# Patient Record
Sex: MALE | Race: WHITE | NOT HISPANIC OR LATINO | Employment: STUDENT | URBAN - METROPOLITAN AREA
[De-identification: names, ages, dates, MRNs, and addresses within clinical notes are randomized per-mention and may not be internally consistent; named-entity substitution may affect disease eponyms.]

---

## 2024-04-11 ENCOUNTER — ATHLETIC TRAINING (OUTPATIENT)
Dept: SPORTS MEDICINE | Facility: OTHER | Age: 19
End: 2024-04-11

## 2024-04-11 ENCOUNTER — TELEPHONE (OUTPATIENT)
Age: 19
End: 2024-04-11

## 2024-04-11 DIAGNOSIS — G44.319 ACUTE POST-TRAUMATIC HEADACHE, NOT INTRACTABLE: Primary | ICD-10-CM

## 2024-04-11 NOTE — PROGRESS NOTES
This athlete came to the athletic training room around 6:30 this morning 4/11/24 before practice to report and injury that occurred at the game on 4/10. The hit that the athlete described was not witnessed by the  and during the game the athlete did not report any incident or symptoms to the athletic training staff. The athlete told the  during evaluation this morning that he felt fine during the game and that he had no issues finishing the game. He said that once he got home and took a shower and his body had time to relax a little that he started to develop some symptoms and that he woke up with the same symptoms this morning. He reports a Headache (3),Pressure in Head (2), Nausea (4), Sensitivity to Noise (1), Fatigue (4), Irritability (1), Nervous or anxious (1) trouble falling asleep (3) during his SCAT six exam this morning. The athlete reported that he took a hit helmet to helmet in his lacrosse game yesterday in the second quarter. The athlete described the hit as hard but no symptoms imdeatly after. This morning the athlete looked a little tired and slowed from his usual self. He also did not have as much energy and had no quick one line jokes as he usually has. Other than that the athlete is acting normal and all other vitals normal. VOMS exam was WNL but convergence did make the symptoms slightly worse. The athlete reports no other pain at this time. The athlete has had a previous concussion while in college. That was in his freshman year in 2021. The athlete also reports 2 others in HS. The athlete was out 3wks with the injury in 2021. The athlete will be referred to the team physican for follow up exam

## 2024-04-12 ENCOUNTER — TELEPHONE (OUTPATIENT)
Dept: OBGYN CLINIC | Facility: CLINIC | Age: 19
End: 2024-04-12

## 2024-04-12 ENCOUNTER — OFFICE VISIT (OUTPATIENT)
Dept: OBGYN CLINIC | Facility: CLINIC | Age: 19
End: 2024-04-12

## 2024-04-12 VITALS
WEIGHT: 150 LBS | SYSTOLIC BLOOD PRESSURE: 127 MMHG | BODY MASS INDEX: 21 KG/M2 | DIASTOLIC BLOOD PRESSURE: 76 MMHG | HEIGHT: 71 IN | HEART RATE: 85 BPM

## 2024-04-12 DIAGNOSIS — G44.319 ACUTE POST-TRAUMATIC HEADACHE, NOT INTRACTABLE: Primary | ICD-10-CM

## 2024-04-12 DIAGNOSIS — J06.9 UPPER RESPIRATORY INFECTION, ACUTE: ICD-10-CM

## 2024-04-12 PROCEDURE — 99203 OFFICE O/P NEW LOW 30 MIN: CPT | Performed by: ORTHOPAEDIC SURGERY

## 2024-04-12 RX ORDER — AZITHROMYCIN 250 MG/1
TABLET, FILM COATED ORAL
COMMUNITY
Start: 2024-04-10

## 2024-04-12 NOTE — PROGRESS NOTES
Assessment/Plan:  1. Acute post-traumatic headache, not intractable        2. Upper respiratory infection, acute              Hiren appears to have a normal physical examination today and a normal impact test yesterday.  His questionable injury was not worsened during the game or activity but only had some slight symptoms that evening which resolved after sleep.  Is possible that his headache and nausea were secondary to his fatigue from playing in the game and current upper respiratory infection and antibiotic treatment.  I do think it is safe for him to continue playing and he does not appear to have a concussion today.  I have talked with his athletic training staff and they will put him through a few physical activities over the next 2 days before playing to ensure he is not feeling he is cleared for sports at this time.  His athletic training staff will follow him closely for any other symptoms.      Patient ID: Hiren Hylton is a 18 y.o. male presents to the office for concussion evaluation following a hit to the head during lacrosse game 2 days ago.  He attends [employer].  He states he played the whole game and did not have any symptoms following the hit.  Later that evening he felt very fatigued and had some headache symptoms and some nausea.  The symptoms resolved by the next day.  He reported them to his athletic training staff yesterday and underwent a full evaluation and impact test.  This was within normal limits and he did not have elevation of his symptoms over the last 24 hours.  He states that he is under current treatment for an upper respiratory infection and is on oral antibiotics.  He feels like maybe his symptoms could have been cold related that day after playing in the game.  He denies any symptoms today.    Injury Description:  Date / Time: 4/10/2024  Injury Description: Helmet to helmet hit during lacrosse  Location:  Frontal  Cause:  Sports:  lacrosse  LOC:   no  Amnesia:   Retrograde:  no   Anterograde:  no   Seizures:  No  ER Visit: No  CT Scan:  No     Concussion Risk Factors:  History of Concussion: Yes, How many?  3, Time of Recovery? 1-2 w, and Last concussion?  2021  History of Migraines: No  Family History of Headache:  No  Developmental History:  none  Psychiatric History:  none  History of Sleep Disorder:  No  Do symptoms worsen with Physical Activity?  No  Do symptoms worsen with Cognitive Activity?  No  What percent is this person back to normal?  Patient 100 %    Symptoms Checklist      Flowsheet Row Most Recent Value   Physical    Headache 0   Nausea 0   Vomiting 0   Balance problems 0   Dizziness 0   Visual problems 0   Fatigue 0   Sensitivity to light 0   Sensitivity to noise 0   Numbness / tingling 0   TOTAL PHYSICAL SCORE 0   Cognitive    Foggy 0   Slowed down 0   Difficulty concentrating 0   Difficulty remembering 0   TOTAL COGNITIVE SCORE 0   Emotional    Irritability 0   Sadness 0   More emotional 0   Nervousness 0   TOTAL EMOTIONAL SCORE 0   Sleep    Drowsiness 0   Sleeping less 0   Sleeping more 0   Difficulty falling asleep 0   TOTAL SLEEP SCORE 0   TOTAL SYMPTOM SCORE 0            Review of Systems   Constitutional:  Negative for chills, fever and unexpected weight change.   HENT:  Negative for hearing loss, nosebleeds and sore throat.    Eyes:  Negative for pain, redness and visual disturbance.   Respiratory:  Negative for cough, shortness of breath and wheezing.    Cardiovascular:  Negative for chest pain, palpitations and leg swelling.   Gastrointestinal:  Negative for abdominal pain, nausea and vomiting.   Endocrine: Negative for polyphagia and polyuria.   Genitourinary:  Negative for dysuria and hematuria.   Musculoskeletal:         See HPI   Skin:  Negative for rash and wound.   Neurological:  Negative for dizziness, numbness and headaches.   Psychiatric/Behavioral:  Negative for decreased concentration and suicidal ideas. The patient is  not nervous/anxious.      History reviewed. No pertinent past medical history.    History reviewed. No pertinent surgical history.    Family History   Family history unknown: Yes       Social History     Occupational History    Not on file   Tobacco Use    Smoking status: Never    Smokeless tobacco: Never   Vaping Use    Vaping status: Never Used   Substance and Sexual Activity    Alcohol use: Never    Drug use: Never    Sexual activity: Not on file         Current Outpatient Medications:     azithromycin (ZITHROMAX) 250 mg tablet, , Disp: , Rfl:     No Known Allergies    Objective:  Vitals:    04/12/24 1040   BP: 127/76   Pulse: 85       Ortho Exam    Physical Exam  Vitals and nursing note reviewed.   Constitutional:       Appearance: Normal appearance. He is well-developed.   HENT:      Head: Normocephalic and atraumatic.      Right Ear: External ear normal.      Left Ear: External ear normal.      Nose: Nose normal.   Eyes:      General: No scleral icterus.     Extraocular Movements: Extraocular movements intact.      Conjunctiva/sclera: Conjunctivae normal.   Cardiovascular:      Rate and Rhythm: Normal rate.   Pulmonary:      Effort: Pulmonary effort is normal. No respiratory distress.   Musculoskeletal:      Cervical back: Normal range of motion and neck supple.      Comments: See Ortho exam   Skin:     General: Skin is warm and dry.   Neurological:      General: No focal deficit present.      Mental Status: He is alert and oriented to person, place, and time.   Psychiatric:         Behavior: Behavior normal.         General:   NAD:  Yes  Psych:   AAOX3:  Yes   Mood and Affect:  Normal  HEENT:   Lacerations:  No   Bruising:  No   PEERLA:  Yes   EOMI: Yes   Fracture/Trauma:  No    Vestibular Ocular:     Gaze stability:    Nystagmus: no    Saccades: no/horizontal/vertical: normal    Vestibular Motion: normal    Convergence:  6cm  Neuro:   CNII - XII Intact:  Yes   Examination of Coordination:    Limited Balance:    No    Romberg:  normal    Forward Tandem Gait:  normal    Backward Tandem Gait:   normal    Eyes Close Tandem Gait:   normal        FTN: normal    Diadochokinesia: normal            This document was created using speech voice recognition software.   Grammatical errors, random word insertions, pronoun errors, and incomplete sentences are an occasional consequence of this system due to software limitations, ambient noise, and hardware issues.   Any formal questions or concerns about content, text, or information contained within the body of this dictation should be directly addressed to the provider for clarification.

## 2024-04-12 NOTE — LETTER
April 12, 2024     Patient: Hiren Hylton  YOB: 2005  Date of Visit: 4/12/2024      To Whom it May Concern:    Hiren Hylton is under my professional care. Hiren was seen in my office on 4/12/2024. Hiren cleared for sports at the discretion of his athletic training staff as of 4/12/2024.    If you have any questions or concerns, please don't hesitate to call.         Sincerely,          Lobito Rogel,         CC: No Recipients

## 2024-04-12 NOTE — TELEPHONE ENCOUNTER
LM stating that Judy had put in his school insurance so they co pay he paid was given back onto his card.